# Patient Record
Sex: FEMALE | Race: BLACK OR AFRICAN AMERICAN | NOT HISPANIC OR LATINO | Employment: FULL TIME | URBAN - METROPOLITAN AREA
[De-identification: names, ages, dates, MRNs, and addresses within clinical notes are randomized per-mention and may not be internally consistent; named-entity substitution may affect disease eponyms.]

---

## 2017-01-06 ENCOUNTER — OFFICE VISIT (OUTPATIENT)
Dept: PSYCHIATRY | Facility: CLINIC | Age: 32
End: 2017-01-06
Payer: COMMERCIAL

## 2017-01-06 VITALS
HEART RATE: 99 BPM | HEIGHT: 60 IN | WEIGHT: 152 LBS | SYSTOLIC BLOOD PRESSURE: 119 MMHG | DIASTOLIC BLOOD PRESSURE: 69 MMHG | BODY MASS INDEX: 29.84 KG/M2

## 2017-01-06 DIAGNOSIS — F33.0 MDD (MAJOR DEPRESSIVE DISORDER), RECURRENT EPISODE, MILD: ICD-10-CM

## 2017-01-06 PROCEDURE — 99205 OFFICE O/P NEW HI 60 MIN: CPT | Mod: S$GLB,,, | Performed by: PSYCHIATRY & NEUROLOGY

## 2017-01-06 PROCEDURE — 99999 PR PBB SHADOW E&M-EST. PATIENT-LVL III: CPT | Mod: PBBFAC,,, | Performed by: PSYCHIATRY & NEUROLOGY

## 2017-01-06 PROCEDURE — 1159F MED LIST DOCD IN RCRD: CPT | Mod: S$GLB,,, | Performed by: PSYCHIATRY & NEUROLOGY

## 2017-01-06 RX ORDER — TRAZODONE HYDROCHLORIDE 50 MG/1
50 TABLET ORAL NIGHTLY PRN
Qty: 30 TABLET | Refills: 2 | Status: SHIPPED | OUTPATIENT
Start: 2017-01-06 | End: 2019-06-20

## 2017-01-06 RX ORDER — DULOXETIN HYDROCHLORIDE 30 MG/1
30 CAPSULE, DELAYED RELEASE ORAL DAILY
Qty: 30 CAPSULE | Refills: 2 | Status: SHIPPED | OUTPATIENT
Start: 2017-01-06 | End: 2018-04-03

## 2017-01-06 NOTE — PROGRESS NOTES
"Outpatient Psychiatry Initial Visit (MD/NP)    1/6/2017    Gauri Calle, a 31 y.o. female, presenting for initial evaluation visit. Met with patient.    Reason for Encounter: self-referral. Patient complains of depression.    History of Present Illness:  The pt notes that she is presenting for a review of her current medication regimen to see if there's a way to augment her current regimen. She notes that she is currently taking Elavil 60 mg PO QHS and Wellbutrin  mg PO Daily. She notes that she does find some efficacy in this regimen she is experiencing daytime fatigue that she attributes to her PM dose of Amitriptyline. She also notes that she is experiencing anxiety that is not well controlled by her current regimen. We discuss stopping her PM amitriptyline by titrating off the drug over the next week while starting treatment with Cymbalta 30 mg PO daily... We also discuss starting Trazodone 50 mg PO QHS PRN insomnia. We discuss the risks, benefits, side effects, inherent unpredictability and alternatives to this medication regimen. She opts to make these changes and is noted to have capacity to make medical decisions. We plan to f/u in approximately 1 month.     Past Psychiatric History:   Previous Psychiatric Hospitalizations: No   Previous Medication Trials: Amitriptyline, Wellbutrin  Previous Suicide Attempts: No   History of Violence: No     Nerurological History:   Seizures: No   Head trauma: No     Social History:   Developmental: "good" childhood   Education: College graduate   Employment Status/Info: Employed, as an  at a medical office   Financial: Stable   Marital Status:    Children: 0   Housing Status: Lives with  in [facility]   history: No  History of phys/sexual abuse: No   Access to gun: No   Gnosticism: No     Substance Abuse History:   Recreational Drugs: Denies   Use of Alcohol: Occasionally    Tobacco Use: Denies   Rehab History: No     Legal History: " "  Past Charges/Incarcerations: No   Pending charges: No     Family Psychiatric History:   Father and mother suffer from depression  Paternal aunt suffers from schizophrenia         Review Of Systems:     GENERAL:  No weight gain or loss  SKIN:  No rashes or lacerations  HEAD:  No headaches  EYES:  No exophthalmos, jaundice or blindness  EARS:  No dizziness, tinnitus or hearing loss  NOSE:  No changes in smell  MOUTH & THROAT:  No dyskinetic movements or obvious goiter  CHEST:  No shortness of breath, hyperventilation or cough  CARDIOVASCULAR:  No tachycardia or chest pain  ABDOMEN:  No nausea, vomiting, pain, constipation or diarrhea  URINARY:  No frequency, dysuria or sexual dysfunction  ENDOCRINE:  No polydipsia, polyuria  MUSCULOSKELETAL:  No pain or stiffness of the joints  NEUROLOGIC:  No weakness, sensory changes, seizures, confusion, memory loss, tremor or other abnormal movements  Pertinent items are noted in HPI.    Psychiatric Review Of Systems - Is patient experiencing or having changes in:  sleep: yes, insomnia treated with Elavil  appetite: no  weight: no  energy/anergy: yes, notes decrease energy  interest/pleasure/anhedonia: no  somatic symptoms: no  libido: yes, decreased, pt attributes to medication  anxiety/panic: no  guilty/hopelessness: yes, "sort of" "I feel like I should be doing more than I am career-wise"  concentration: Yes, easily distracted recently  S.I.B.s/risky behavior: no  Irritability: yes, notes low level of patience   Racing thoughts: no  Impulsive behaviors: no  Paranoia:no  AVH:no    Current Evaluation:     Nutritional Screening: Considering the patient's height and weight, medications, medical history and preferences, should a referral be made to the dietitian? no    Constitutional  Vitals:  Most recent vital signs, dated greater than 90 days prior to this appointment, were reviewed.    There were no vitals filed for this visit.     General:  unremarkable, age appropriate " "    Musculoskeletal  Muscle Strength/Tone:  no spasicity   Gait & Station:  non-ataxic     Psychiatric  Speech:  no latency; no press   Mood & Affect:  euthymic  congruent and appropriate   Thought Process:  normal and logical   Associations:  intact   Thought Content:  normal, no suicidality, no homicidality, delusions, or paranoia   Insight:  intact   Judgement: behavior is adequate to circumstances   Orientation:  grossly intact   Memory: intact for content of interview   Language: grossly intact   Attention Span & Concentration:  able to focus   Fund of Knowledge:  intact and appropriate to age and level of education       Relevant Elements of Neurological Exam: normal gait    Functioning in Relationships:  Spouse/partner: Notes a good relationship, but cites that she occasionally will "snap" at him  Peers: The pt notes numerous strong friendships in the area  Employers: 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  Latest known visit with results is:    Office Visit on 05/17/2016   Component Date Value Ref Range Status    Fungus (Mycology) Culture 05/17/2016 No fungus isolated after 4 weeks   Final         Medications  Outpatient Encounter Prescriptions as of 1/6/2017   Medication Sig Dispense Refill    amitriptyline (ELAVIL) 10 MG tablet 1 tablet per night the1st week then increase by one tablet every week until the pain is no longer felt or side effects occur. 90 tablet 3    amitriptyline (ELAVIL) 50 MG tablet Take 1 tablet (50 mg total) by mouth every evening. 90 tablet 3    buPROPion (WELLBUTRIN XL) 300 MG 24 hr tablet Take 300 mg by mouth once daily.      levocetirizine (XYZAL) 5 MG tablet TK 1 T PO  QD  1    LO LOESTRIN FE 1 mg-10 mcg (24)/10 mcg (2) Tab Take 1 tablet by mouth once daily. 28 tablet 11    montelukast (SINGULAIR) 10 mg tablet        No " facility-administered encounter medications on file as of 1/6/2017.            Assessment - Diagnosis - Goals:     Impression:   MDD, recurrent  R/o TYRONE    Strengths and Liabilities: Strength: Patient accepts guidance/feedback, Strength: Patient is intelligent., Strength: Patient is motivated for change., Strength: Patient is physically healthy., Strength: Patient has reasonable judgment., Strength: Patient is stable.    Treatment Goals:  Specify outcomes written in observable, behavioral terms:   Depression: acquiring relapse prevention skills, increasing energy, increasing interest in usual activities, increasing motivation, reducing excessive guilt, reducing fatigue and reducing negative automatic thoughts    Treatment Plan/Recommendations:   · Medication Management: The risks and benefits of medication were discussed with the patient. Tritate off Amitriptyline over the next week while starting Cymbalta 30 mg PO daily for mood and anxiety.  · Referral for further treatment to psychologist for psychotherapy  · The treatment plan and follow up plan were reviewed with the patient.      Return to Clinic: 1 month       Counseling time: 55 mins  Total time: 65 mins  Consulting clinician was informed of the encounter and consult note.    Lion Garrison MD  Psychiatry Resident

## 2017-02-24 ENCOUNTER — TELEPHONE (OUTPATIENT)
Dept: OBSTETRICS AND GYNECOLOGY | Facility: CLINIC | Age: 32
End: 2017-02-24

## 2017-02-24 NOTE — TELEPHONE ENCOUNTER
----- Message from Jewel Garcia sent at 2/24/2017 12:59 PM CST -----  Contact: pt  x_ 1st Request   _ 2nd Request   _ 3rd Request     Who: JULIÁN JUNE [[id number]]    Why: pt is requesting a call back in reference to getting some lab orders.    What Number to Call Back: [phone]    When to Expect a call back: (Before the end of the day)   -- if call after 3:00 call back will be tomorrow.

## 2017-02-24 NOTE — TELEPHONE ENCOUNTER
Returning a call. Spoke to patient in regards to her request for general health screen orders to have blood work due to c/o fatigue. Patient wants a call back from Maya to discuss further issues. Patient was informed Maya is out until Wednesday and the message will be sent to her. Patient verbalized all understanding. Message sent to provider/staff.

## 2017-03-01 NOTE — TELEPHONE ENCOUNTER
"Answered questions about general health screening, discussed should be done with PCP. Pt states did go to see someone on Monday and had "UTI and Sinus infection". Answered questions about next exam due with LINDSAY Singh due after June 16th, appt scheduled with pt  "

## 2017-05-10 DIAGNOSIS — Z30.41 SURVEILLANCE OF CONTRACEPTIVE PILL: ICD-10-CM

## 2017-05-10 DIAGNOSIS — Z01.419 WELL WOMAN EXAM WITH ROUTINE GYNECOLOGICAL EXAM: ICD-10-CM

## 2017-05-10 DIAGNOSIS — N94.810 VULVAR VESTIBULITIS: ICD-10-CM

## 2017-05-10 RX ORDER — NORETHINDRONE ACETATE AND ETHINYL ESTRADIOL, ETHINYL ESTRADIOL AND FERROUS FUMARATE 1MG-10(24)
KIT ORAL
Qty: 28 TABLET | Refills: 0 | Status: SHIPPED | OUTPATIENT
Start: 2017-05-10 | End: 2017-06-05 | Stop reason: SDUPTHER

## 2017-06-05 DIAGNOSIS — Z30.41 SURVEILLANCE OF CONTRACEPTIVE PILL: ICD-10-CM

## 2017-06-05 DIAGNOSIS — N94.810 VULVAR VESTIBULITIS: ICD-10-CM

## 2017-06-05 DIAGNOSIS — Z01.419 WELL WOMAN EXAM WITH ROUTINE GYNECOLOGICAL EXAM: ICD-10-CM

## 2017-06-09 RX ORDER — NORETHINDRONE ACETATE AND ETHINYL ESTRADIOL, ETHINYL ESTRADIOL AND FERROUS FUMARATE 1MG-10(24)
KIT ORAL
Qty: 28 TABLET | Refills: 0 | Status: SHIPPED | OUTPATIENT
Start: 2017-06-09 | End: 2019-06-20

## 2018-04-03 ENCOUNTER — OFFICE VISIT (OUTPATIENT)
Dept: OBSTETRICS AND GYNECOLOGY | Facility: CLINIC | Age: 33
End: 2018-04-03
Attending: OBSTETRICS & GYNECOLOGY
Payer: COMMERCIAL

## 2018-04-03 VITALS
SYSTOLIC BLOOD PRESSURE: 110 MMHG | BODY MASS INDEX: 26.84 KG/M2 | HEIGHT: 60 IN | DIASTOLIC BLOOD PRESSURE: 80 MMHG | WEIGHT: 136.69 LBS

## 2018-04-03 DIAGNOSIS — N94.810 VULVAR VESTIBULITIS: Primary | ICD-10-CM

## 2018-04-03 DIAGNOSIS — N94.10 FEMALE DYSPAREUNIA: ICD-10-CM

## 2018-04-03 DIAGNOSIS — B37.31 CANDIDIASIS OF VULVA AND VAGINA: ICD-10-CM

## 2018-04-03 DIAGNOSIS — F33.0 MDD (MAJOR DEPRESSIVE DISORDER), RECURRENT EPISODE, MILD: ICD-10-CM

## 2018-04-03 PROCEDURE — 87210 SMEAR WET MOUNT SALINE/INK: CPT | Mod: QW,S$GLB,, | Performed by: OBSTETRICS & GYNECOLOGY

## 2018-04-03 PROCEDURE — 99214 OFFICE O/P EST MOD 30 MIN: CPT | Mod: S$GLB,,, | Performed by: OBSTETRICS & GYNECOLOGY

## 2018-04-03 PROCEDURE — 99999 PR PBB SHADOW E&M-EST. PATIENT-LVL III: CPT | Mod: PBBFAC,,, | Performed by: OBSTETRICS & GYNECOLOGY

## 2018-04-03 PROCEDURE — 87102 FUNGUS ISOLATION CULTURE: CPT

## 2018-04-03 RX ORDER — LIDOCAINE 50 MG/G
OINTMENT TOPICAL
Qty: 50 G | Refills: 2 | Status: SHIPPED | OUTPATIENT
Start: 2018-04-03 | End: 2018-08-07

## 2018-04-03 RX ORDER — AMITRIPTYLINE HYDROCHLORIDE 10 MG/1
TABLET, FILM COATED ORAL
Qty: 90 TABLET | Refills: 2 | Status: SHIPPED | OUTPATIENT
Start: 2018-04-03 | End: 2019-06-20 | Stop reason: DRUGHIGH

## 2018-04-03 NOTE — PROGRESS NOTES
Subjective:     Patient ID: Gauri Alas is a 33 y.o. female.     Chief Complaint: Vaginal Dryness and Painful [place] (burning sensation afterwards)     History of Present Illness: This patient is a 33 y.o. female, who presents to the GYN Vulva clinic for reevaluation of vulvodynia and dyspareunia.  The patient discontinue the use of all her vulvodynia medications and has redeveloped discomfort with intercourse.  She request a second referral to the pelvic floor physical therapist.        Patient's last menstrual period was 03/21/2018 (approximate).    Review of Systems    GENERAL: No fever, chills, fatigability or weightchange  SKIN: No rashes, itching or changes in color or texture of skin.  HEAD: No headaches or recent head trauma.  EYES: Visual acuity fine. No photophobia,r diplopia.  EARS: Denies earache or vertigo  NOSE: No loss of smell, no epistaxis or postnasal drip.  MOUTH & THROAT: No hoarseness or change in voice.   NODES: Denies swollen glands.  CHEST: Denies MARIE, cyanosis, wheezing, cough and sputum production.  CARDIOVASCULAR: Denies chest pain, PND, orthopnea or reduced exercise tolerance.  ABDOMEN: Appetite fine. No weight loss. bloating, Denies diarrhea, abdominal pain, hematemesis or blood in stool.  URINARY: No flank pain, dysuria or hematuria.  PERIPHERAL VASCULAR: No claudication or cyanosis.Varicosities  MUSCULOSKELETAL: No joint stiffness or swelling. Denies back pain.muscle aches  NEUROLOGIC: No history of seizures, paralysis, alteration of gait or coordination.       Objective:       Physical Exam     APPEARANCE: Well nourished, well developed, in no acute distress.    GENITOURINARY:  Vulva: No lesions. abnormal female genital architecture with reduction in the size of the labia minora. There was no evidence of fusion of the prepuce and frenulum.  There were no white plaques noted. Q-Tip test indicates a 1-2 out of 5 discomfort in the vestibule from 5 to 7:00  Urethral Meatus:  Normal size and location, no lesions, no prolapse.  Urethra: No masses, tenderness, prolapse or scarring.  Vagina: moist withrugae, no discharge, no significant cystocele or rectocele.  Cervix: No lesions, normal diameter, no stenosis, no cervical motion tenderness. .  Uterus: 6 week size, regular shape, mobile, non-tender, normal position, good support.  Adnexa: No masses, tenderness or CDS nodularity.  Anus Perineum: No lesions, no relaxation, no external hemorrhoids.  Abdomen: No masses, tenderness, hernia or ascites, no hepatasplenomegaly  Skin: No rashes, lesions, ulcers, acne, hirsutism.  Peripheral/lower extremities: No edema, erythema or tenderness.  Lymphatic: No axillary, neck or groin nodes palp.  Mental Status: Alert, oriented x 3, normal affect and mood.              @PROCEDURE:@  Wet Prep:  pH = 4.4  -WBCS = rare  -Lactobacilli = numerous  -BV = Amsel  Negative  -Candida = Hyphae none seen  -Trichomnas = none seen  -Cells- Basal and Parabasal, Superficial: Maturation: Superficial cells predominate  -Impression:Normal wet prep  -Treatment: none indicated  -[facility] in 12 weeks         Assessment:      1. Vulvodynia/Vestibuodynia    2. Female dyspareunia    3. MDD (major depressive disorder), recurrent episode, mild    4. Candidiasis of vulva and vagina               Plan:  1.  Discussed the use of amitriptyline progressive therapy, GBA cream  And lidocaine ointment.  2.  Refer to pelvic floor physical therapist as per patient request.  3.  Candida culture of the vagina taken today.  4.  Return to clinic in 12 weeks.                        Orders Placed This Encounter   Procedures    Fungus culture

## 2018-04-27 ENCOUNTER — CLINICAL SUPPORT (OUTPATIENT)
Dept: REHABILITATION | Facility: HOSPITAL | Age: 33
End: 2018-04-27
Attending: OBSTETRICS & GYNECOLOGY
Payer: COMMERCIAL

## 2018-04-27 DIAGNOSIS — M62.9 DISORDER OF MUSCLE: Primary | ICD-10-CM

## 2018-04-27 PROCEDURE — 97110 THERAPEUTIC EXERCISES: CPT | Mod: PO

## 2018-04-27 PROCEDURE — 97161 PT EVAL LOW COMPLEX 20 MIN: CPT | Mod: PO

## 2018-04-27 NOTE — PLAN OF CARE
Time in: 8:23 (late arrival)  Time out: 9:05      Outpatient Physical Therapy   Initial Evaluation    Gauri is a 31 y.o. female evaluated on 4/27/2018    Physician:  Bert Javed III, *   Diagnosis:   Encounter Diagnosis   Name Primary?    Vulvar vestibulitis Yes        Treatment ordered: PT    Medical History:   Past Medical History   Diagnosis Date    Abnormal Pap smear      HPV Neg, repaps    Abnormal Pap smear of cervix 04/2012     ASCUS, NEG HPV    Mental disorder      depression per pt        Surgical History: No past surgical history on file.     Medications:   Current Outpatient Prescriptions   Medication Sig    amitriptyline (ELAVIL) 10 MG tablet 1 tablet per night the1st week then increase by one tablet every week until the pain is no longer felt or side effects occur.    amitriptyline (ELAVIL) 50 MG tablet Take 1 tablet (50 mg total) by mouth every evening.    buPROPion (WELLBUTRIN XL) 300 MG 24 hr tablet Take 300 mg by mouth once daily.    levocetirizine (XYZAL) 5 MG tablet TK 1 T PO  QD    LO LOESTRIN FE 1 mg-10 mcg (24)/10 mcg (2) Tab Take 1 tablet by mouth once daily.    montelukast (SINGULAIR) 10 mg tablet      No current facility-administered medications for this visit.        Allergies: No Known Allergies   Precautions: universal   OB/GYN History: painful vaginal penetration and pelvic pain  Bladder/Bowel History: none       Barriers to Learning: none  Educational/Spiritual/Cultural needs: none  Environmental Barriers: none noted  Abuse/Neglect: no signs  Nutritional Status: well developed well nourished  Fall Risk: none    Subjective     Pt reports that she got  2 weeks after her last appointment 2 years ago.  She does not initiate and enjoy sex at all due to pain.  This is now an issue she would like to address.  She had stopped taking her medications- is now back on amitriptyline and GBA cream.  She doesn't have the GBA yet.  Feels a slight difference with 10mg ami.  She  uses coconut oil for lubricant, and notes tenderness both at the entrance and further in to the vaginal canal.  Most recent UTI was 3-4 months ago.  She reports being able to use tampons- no problems with tight pants or sitting.      Pain: Patient reports 0/10 with 0 being the lowest and 10 being the highest.   Worst pain with last intercourse: 6/10  Best: 0/10    Pain or lack of sensation with vaginal/pelvic exam? Yes  Sexually active? Yes    Previous PT treatment included: one session of pelvic floor PT    Frequency of Urination: Daytime: 5-6        Nighttime: 1-2 (wakes to urge)    Urine Stream: strong    Bladder Leakage: No    Do you feel like you are emptying completely? 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      Patient's Goals: To reduce the amount of medication I'm on if possible, address the issues that are causing the pelvic pain as well as be able to enjoy and have pleasure during intercourse/sexual activity with my partner.    Objective     ORTHO SCREEN  Posture: WNL     Pelvic Alignment: no deviations noted in supine      ABDOMINALS  Scarring: none      Diastasis: none   Abdominal strength: Rectus: 3+/5     Transverse: palpable contraction       VAGINAL PELVIC FLOOR EXAM  EXTERNAL ASSESSMENT  Skin Condition: WNL, symmetry  Scarring: none  Sensation: WNL  Pain: Q-tip testing as follows: painful at 6-9 o'clock with redness/irritation noted  Introitus: WNL   Voluntary Contraction:  minimally visible lift (difficult to visualize due to increased tissue resistance)  Voluntary Relaxation: nil  Involuntary Contraction: minimally visible lift  Involuntary Relaxation: nil  Perineal Descent: absent   Bulge: present    INTERNAL ASSESSMENT  Pain: tender areas noted as follows: levator ani tight on right; pain at 6 o'clock position at entry point  Sensation: able to localize pressure appropriately    Vaginal Vault: tight, bulky  Muscle Bulk: hypertonus  Muscle Power: 4/5 R, 3/5 L     Quality of Contraction: slow relaxation and incomplete relaxation  Specificity: pt demonstrates increased mm tone throughout overflow mm with difficulty relaxing   Coordination: tends to hold breath during PFM contraction  Prolapse Check: none    SEMG EVALUATION: Deferred due to time constraints    TREATMENT  Pt received 10 minutes of individual therapy including: diaphragmatic breathing in supine, and yoga postures for drop.  She was also given the [facility] website to order a dilator set.     Education: Instructed on general anatomy/physiology of urinary/bowel system; discussed plan of care with patient; instructed in purpose of physical therapy and the  benefits/risks of treatment; instructed in risks of refusing treatment. Patient agreed to treatment plan. Gauri demonstrated and verbalized understanding of all instruction and was provided with a handout of HEP (see Patient Instructions).    Assessment     This is a 31 y.o. female referred to outpatient physical therapy and presents with a medical diagnosis of vulvar vestibulitis and dyspareunia since 2008 and demonstrates limitations as described in the problem list. . Pt will benefit from physcial therapy services in order to maximize pain free functional independence and improve her QOL. The following goals were discussed with the patient and she is in agreement with them as to be addressed in the treatment plan.  Pt verbally understood the instructions as they were  given and demonstrated proper form and technique during therapy.   Prognosis: good    Problem List  Pelvic floor tenderness  Pain with intercourse  Decreased pelvic muscle strength  Decreased endurance of the pelvic muscles  Decreased phasic ability of the pelvic muscles  Increased tension of the pelvic muscles  Poor quality of pelvic muscle contraction  Myofascial restriction and decreased mobility of overflow muscles including adductors and gluts    GOALS    Long Term Goals: 3 months   1. Pt will be able to maintain normal breathing pattern during pelvic floor muscle contraction in order to decrease pain and improve PF function.  2. Pt will report ability to tolerate intercourse with no more than 2/10 pain on initial penetration or deeply.  3. Pt will report 0-1/10 pelvic pain during walking in order to maximize functional independence and improve QOL.  4. Pt will be I with self management of symptoms  5. Pt will be I with HEP.        Plan     Pt will be treated by physical therapy once per 1-2 weeks for 3 months for Pt education, HEP, therapeutic exercises, neuromuscular re-education, therapeutic activity, manual therapy, and modalities (including SEMG) PRN to achieve established goals.       I have reviewed the therapist's note,and agree with the diagnosis and management plan

## 2018-04-27 NOTE — PATIENT INSTRUCTIONS
Home Exercise Program: 04/27/2018    DIAPHRAGMATIC BREATHING     The diaphragm is a dome shaped muscle that forms the floor of the rib cage. It is the most efficient muscle for breathing and relaxation, although most people are not used to using the diaphragm. Diaphragmatic or belly breathing is an important technique to learn because it helps settle down or relax the autonomic nervous system. The correct use of diaphragmatic breathing can help to quiet brain activity resulting in the relaxation of all the muscles and organs of the body. This is accomplished by slow rhythmic breathing concentrated in the diaphragm muscle rather than the chest.    How to do proper relaxation breathing:     Start by lying on your back or reclining in a chair in a relaxed position. Place one hand on your chest and the other on your abdomen.   Relax your jaw by placing your tongue on the roof of your mouth and keeping your teeth slightly apart.    Take a deep breath in, letting the abdomen expand and rise while you keep your upper chest, neck and shoulders relaxed.    As you breathe out, allow your abdomen and chest to fall. Exhale completely.   It doesn't matter if you breathe in/out through your nose and/or mouth. Do whichever feels comfortable.   Remember to breathe slowly.  Do not force your breathing. Do not hold your breath.   Repeat for 5-10 minutes every day.      Home Program: 04/27/2018    YOGA POSES   to improve pelvic floor muscle DROP.  Maintain each position for 1 minute, 1-2 times per day.       Deep Squat    Happy Baby            Child's Pose

## 2018-05-10 ENCOUNTER — CLINICAL SUPPORT (OUTPATIENT)
Dept: REHABILITATION | Facility: HOSPITAL | Age: 33
End: 2018-05-10
Attending: OBSTETRICS & GYNECOLOGY
Payer: COMMERCIAL

## 2018-05-10 ENCOUNTER — OFFICE VISIT (OUTPATIENT)
Dept: OBSTETRICS AND GYNECOLOGY | Facility: CLINIC | Age: 33
End: 2018-05-10
Attending: OBSTETRICS & GYNECOLOGY
Payer: COMMERCIAL

## 2018-05-10 VITALS
HEIGHT: 60 IN | SYSTOLIC BLOOD PRESSURE: 114 MMHG | DIASTOLIC BLOOD PRESSURE: 70 MMHG | BODY MASS INDEX: 27.44 KG/M2 | WEIGHT: 139.75 LBS

## 2018-05-10 DIAGNOSIS — Z01.419 WELL WOMAN EXAM WITH ROUTINE GYNECOLOGICAL EXAM: Primary | ICD-10-CM

## 2018-05-10 DIAGNOSIS — N94.810 VULVAR VESTIBULITIS: ICD-10-CM

## 2018-05-10 DIAGNOSIS — N94.10 FEMALE DYSPAREUNIA: ICD-10-CM

## 2018-05-10 DIAGNOSIS — M62.9 DISORDER OF MUSCLE: Primary | ICD-10-CM

## 2018-05-10 LAB — FUNGUS SPEC CULT: NORMAL

## 2018-05-10 PROCEDURE — 97110 THERAPEUTIC EXERCISES: CPT | Mod: PO

## 2018-05-10 PROCEDURE — 99395 PREV VISIT EST AGE 18-39: CPT | Mod: S$GLB,,, | Performed by: OBSTETRICS & GYNECOLOGY

## 2018-05-10 PROCEDURE — 99999 PR PBB SHADOW E&M-EST. PATIENT-LVL II: CPT | Mod: PBBFAC,,, | Performed by: OBSTETRICS & GYNECOLOGY

## 2018-05-10 NOTE — PATIENT INSTRUCTIONS
Home Exercise Program: 05/10/2018    DILATOR TRAINING  1. Make sure the dilator is clean, free of any visible soiling.   2. Apply water-based lubricant (ie. Slippery stuff, coconut oil, olive oil) to the dilator and the vaginal opening.   3. Either sit on the toilet and lean back on the tank OR lay back with your back well supported by several pillows and both knees bent.   4. Spread labia and insert the dilator.  Do pressure desensitization at the entrance (vestibule) if you need to.      With the smallest size:  5. Apply downward pressure onto the levator ani group of muscles on one side.     To find the levator ani muscles:   - In the middle at 6 o'clock = Rectum   - Move a little to the right at 4 o'clock = Right levator ani group   - Move a little to the left at 8 o'clock = Left levator ani group   - If you feel sharp, stabbing pain = bone or pudendal nerve (wrong)  6. Begin Diaphragmatic Breathing and drop your pelvic floor muscle (releasing the tension), following the encouragement of the dilator.  7. Repeat on the levator ani group of muscles on the other side.  8. Continue for 5-10 minutes.    With the next size:  9. Insert the dilator until it begins to get moderately uncomfortable. Stop at this point.  10. Begin Diaphragmatic Breathing and focus on DROPPING the pelvic floor muscles, releasing their tension.   11. Once the discomfort has dissipated, insert the dilator a little bit more, and repeat this process.  12. You can also move the dilator slightly side/side or forward/backward to get the tissues use to being mobilized.   13. Continue for 5 minutes.     14. Once finished, remove dilator.  15. Wash with anti-bacterial soap and thoroughly rinse. Let air dry whenever possible. Store in a dry, clean location.   16. Repeat 5 of the 7 days per week.(every other day)    *Don't apply so much pressure that it leaves you too sore to perform again the next day.    STOP if there is increased bleeding, an  infection, or extreme pain. (or pregnancy)

## 2018-05-10 NOTE — PROGRESS NOTES
OUTPATIENT PHYSICAL THERAPY DAILY NOTE       Patient: Gauri Alas   [facility] #:  [id number]    Diagnosis:   Encounter Diagnosis   Name Primary?    Disorder of muscle Yes      Date of treatment: 05/10/2018     Time in: 1:07  Time out: 2:00  Billable time: 45 minutes  Visit #: 2      SUBJECTIVE   Pt reports: pt reports that she ordered her dilator set but it hasn't come in yet.  Has been working on her yoga poses, mainly child's pose and happy baby.  Pain: 0/10 on VAS scale  Pain location: NA    OBJECTIVE     Therapeutic Exercise to develop  flexibility for 45 minutes including: passive stretching of levator ani and connective tissues of the vaginal canal intravaginally.   She was instructed in technique and in procedure for care and cleaning of her dilator set once it comes in.  She tolerated both ischemic pressure and contract/relax fairly well; also tolerated 2 exam fingers well.      Education: Discussed progression of plan of care with patient; educated pt in activity modification; reviewed HEP with pt. Pt demonstrated and verbalized understanding of all instruction and was provided with a handout of HEP (see Patient Instructions).      ASSESSMENT      Pt tolerated entire treatment well with no visible adverse effects. Should be able to start with her dilator at home- she will do well if she is consistent in her home dilator stretching program.    Will the patient continue to benefit from skilled PT intervention? Yes  Medical necessity demonstrated by: skilled PT supervision required for HEP and treatment progression  Progress towards goals:  satisfactory  New/Revised goals: none      PLAN     Continue with established Plan of Care, working toward established PT goals.

## 2018-05-10 NOTE — PROGRESS NOTES
Subjective:     Patient ID: Gauri Alas is a 33 y.o. female.     Chief Complaint: Well Woman     History of Present Illness: This patient is a 33 y.o.  female, who presents to the GYN clinic for her gyn well woman yearly exam.  She denies gyn complaints.  Her vulvodynia problem has improved.  She is on birth control pills for contraception.      No LMP recorded. Patient is not currently having periods (Reason: Birth Control).    Review of Systems    GENERAL: No fever, chills, fatigability or weightchange  SKIN: No rashes, itching or changes in color or texture of skin.  HEAD: No headaches or recent head trauma.  EYES: Visual acuity fine. No photophobia,r diplopia.  EARS: Denies earache or vertigo  NOSE: No loss of smell, no epistaxis or postnasal drip.  MOUTH & THROAT: No hoarseness or change in voice.   NODES: Denies swollen glands.  CHEST: Denies MARIE, cyanosis, wheezing, cough and sputum production.  CARDIOVASCULAR: Denies chest pain, PND, orthopnea or reduced exercise tolerance.  ABDOMEN: Appetite fine. No weight loss. bloating, Denies diarrhea, abdominal pain, hematemesis or blood in stool.  URINARY: No flank pain, dysuria or hematuria.  PERIPHERAL VASCULAR: No claudication or cyanosis.Varicosities  MUSCULOSKELETAL: No joint stiffness or swelling. Denies back pain.muscle aches  NEUROLOGIC: No history of seizures, paralysis, alteration of gait or coordination.       Objective:       Physical Exam     APPEARANCE: Well nourished, well developed, in no acute distress.    GENITOURINARY:  Vulva: No lesions. Normal female genital architecture.  Q-tip test indicated minimal discomfort in the vestibule.  Urethral Meatus: Normal size and location, no lesions, no prolapse.  Urethra: No masses, tenderness, prolapse or scarring.  Vagina: Moist with rugae, no discharge, no significant cystocele or rectocele.  Cervix: No lesions, normal diameter, no stenosis, no cervical motion tenderness. .  Uterus: 6 week size,  regular shape, mobile, non-tender, normal position, good support.  Adnexa: No masses, tenderness or CDS nodularity.  Anus Perineum: No lesions, no relaxation, no external hemorrhoids.  Breasts: Symmetrical, no skin changes or visible lesions. No palpable masses, nipple discharge or adenopathy bilaterally.  Abdomen: No masses, tenderness, hernia or ascites, no hepatasplenomegaly  Neck: Supple. Symmetric without masses. No thyromegaly.  Skin: No rashes, lesions, ulcers, acne, hirsutism.  Respiratory: Breath sounds clear bilateraly. Good air movement. No rales. No wheezes.  Cardiovascular: Normal rate. Regular rhythm.  Peripheral/lower extremities: No edema, erythema or tenderness.  Lymphatic: No axillary, neck or groin nodes palp.  Mental Status: Alert, oriented x 3, normal affect and mood.          @PROCEDURE:@           Assessment:      1. Well woman exam with routine gynecological exam    2. Female dyspareunia    3. Vulvodynia/Vestibuodynia               Plan:  1.  Doing well with vestibulodynia continue present regimen and physical therapy.  2.  Return to clinic for follow-up exam in 6 months.  3.  Return to clinic for well woman exam.                No orders of the defined types were placed in this encounter.

## 2018-08-07 ENCOUNTER — OFFICE VISIT (OUTPATIENT)
Dept: OBSTETRICS AND GYNECOLOGY | Facility: CLINIC | Age: 33
End: 2018-08-07
Attending: OBSTETRICS & GYNECOLOGY
Payer: COMMERCIAL

## 2018-08-07 VITALS
BODY MASS INDEX: 27.57 KG/M2 | HEIGHT: 60 IN | DIASTOLIC BLOOD PRESSURE: 80 MMHG | SYSTOLIC BLOOD PRESSURE: 118 MMHG | WEIGHT: 140.44 LBS

## 2018-08-07 DIAGNOSIS — R30.0 DYSURIA: ICD-10-CM

## 2018-08-07 DIAGNOSIS — N89.8 VAGINAL DISCHARGE: Primary | ICD-10-CM

## 2018-08-07 LAB
CANDIDA RRNA VAG QL PROBE: NEGATIVE
G VAGINALIS RRNA GENITAL QL PROBE: NEGATIVE
T VAGINALIS RRNA GENITAL QL PROBE: NEGATIVE

## 2018-08-07 PROCEDURE — 99999 PR PBB SHADOW E&M-EST. PATIENT-LVL III: CPT | Mod: PBBFAC,,, | Performed by: OBSTETRICS & GYNECOLOGY

## 2018-08-07 PROCEDURE — 99213 OFFICE O/P EST LOW 20 MIN: CPT | Mod: S$GLB,,, | Performed by: OBSTETRICS & GYNECOLOGY

## 2018-08-07 PROCEDURE — 87510 GARDNER VAG DNA DIR PROBE: CPT

## 2018-08-07 PROCEDURE — 87480 CANDIDA DNA DIR PROBE: CPT

## 2018-08-07 PROCEDURE — 87086 URINE CULTURE/COLONY COUNT: CPT

## 2018-08-07 PROCEDURE — 3008F BODY MASS INDEX DOCD: CPT | Mod: CPTII,S$GLB,, | Performed by: OBSTETRICS & GYNECOLOGY

## 2018-08-07 NOTE — PROGRESS NOTES
Gauri Alas is a 33 y.o. female patient  NEW TO ME (patient of Dr. Tello GALLOWAY) who presents today with a 2 day history of dysuria and vaginal discharge with odor. SHe took some leftover Amoxicillin/Clav which she had left over from a dental procedure and has had slight improvement. Periods are spaced out due to taking her pills continuously to avoid cycles.     Patient's last menstrual period was 2018 (approximate).    Past Medical History:   Diagnosis Date    Abnormal Pap smear     HPV Neg, repaps    Abnormal Pap smear of cervix 2012    ASCUS, NEG HPV    Mental disorder     depression per pt     History reviewed. No pertinent surgical history.  Social History     Social History    Marital status: Single     Spouse name: N/A    Number of children: N/A    Years of education: N/A     Occupational History    Not on file.     Social History Main Topics    Smoking status: Never Smoker    Smokeless tobacco: Never Used    Alcohol use 0.6 oz/week     1 Glasses of wine per week      Comment: socially    Drug use: No    Sexual activity: Yes     Partners: Male     Birth control/ protection: OCP      Comment: , together since      Other Topics Concern    Not on file     Social History Narrative    No narrative on file     Family History   Problem Relation Age of Onset    Diabetes Maternal Grandmother     Hypertension Father     Breast cancer Neg Hx     Ovarian cancer Neg Hx     Colon cancer Neg Hx      OB History      Para Term  AB Living    0 0 0 0 0 0    SAB TAB Ectopic Multiple Live Births    0 0 0 0                  ROS:  GENERAL: Feeling well overall.   SKIN: Denies rash or lesions.   HEAD: Denies head injury or headache.   NODES: Denies enlarged lymph nodes.   CHEST: Denies chest pain or shortness of breath.   CARDIOVASCULAR: Denies palpitations or left sided chest pain.   ABDOMEN: No abdominal pain, nausea, vomiting or rectal bleeding.   URINARY: +  dysuria,+ burning on urination. Denies hematuria,   REPRODUCTIVE: See HPI.   BREASTS: Denies pain, lumps, or nipple discharge.   HEMATOLOGIC: No easy bruisability or excessive bleeding.   MUSCULOSKELETAL: Denies joint pain or swelling.   NEUROLOGIC: Denies syncope or weakness.   PSYCHIATRIC: Reports depression.    /80   Ht 5' (1.524 m)   Wt 63.7 kg (140 lb 6.9 oz)   LMP 05/01/2018 (Approximate)   BMI 27.43 kg/m²     PE:   APPEARANCE: Well nourished, well developed, in no acute distress.  ABDOMEN: Soft. No tenderness or masses. No hernias. No CVA tenderness.  VULVA: No lesions. Normal female genitalia.  URETHRAL MEATUS: Normal size and location, no lesions, no prolapse.  URETHRA: No masses, tenderness, prolapse or scarring.  VAGINA: Moist and well rugated, no discharge, no significant cystocele or rectocele.  CERVIX: No lesions and discharge.   UTERUS: Normal size, regular shape, mobile, non-tender, bladder base nontender.  ADNEXA: No masses, tenderness or CDS nodularity.  ANUS PERINEUM: Normal.    PROCEDURES:    1. Vaginal discharge  Vaginosis Screen by DNA Probe   2. Dysuria  Urine culture    AND PLAN:      Follow-up if symptoms worsen or fail to improve.

## 2018-08-08 ENCOUNTER — PATIENT MESSAGE (OUTPATIENT)
Dept: OBSTETRICS AND GYNECOLOGY | Facility: CLINIC | Age: 33
End: 2018-08-08

## 2018-08-08 LAB — BACTERIA UR CULT: NO GROWTH

## 2018-09-05 ENCOUNTER — DOCUMENTATION ONLY (OUTPATIENT)
Dept: REHABILITATION | Facility: HOSPITAL | Age: 33
End: 2018-09-05

## 2019-06-20 ENCOUNTER — OFFICE VISIT (OUTPATIENT)
Dept: OBSTETRICS AND GYNECOLOGY | Facility: CLINIC | Age: 34
End: 2019-06-20
Attending: OBSTETRICS & GYNECOLOGY
Payer: COMMERCIAL

## 2019-06-20 VITALS
WEIGHT: 136.44 LBS | DIASTOLIC BLOOD PRESSURE: 70 MMHG | SYSTOLIC BLOOD PRESSURE: 114 MMHG | HEIGHT: 60 IN | BODY MASS INDEX: 26.79 KG/M2

## 2019-06-20 DIAGNOSIS — Z01.419 WELL WOMAN EXAM WITH ROUTINE GYNECOLOGICAL EXAM: Primary | ICD-10-CM

## 2019-06-20 DIAGNOSIS — N94.10 FEMALE DYSPAREUNIA: ICD-10-CM

## 2019-06-20 DIAGNOSIS — Z30.8 ENCOUNTER FOR OTHER CONTRACEPTIVE MANAGEMENT: ICD-10-CM

## 2019-06-20 DIAGNOSIS — N94.810 VULVAR VESTIBULITIS: ICD-10-CM

## 2019-06-20 PROCEDURE — 99999 PR PBB SHADOW E&M-EST. PATIENT-LVL III: CPT | Mod: PBBFAC,,, | Performed by: OBSTETRICS & GYNECOLOGY

## 2019-06-20 PROCEDURE — 99395 PREV VISIT EST AGE 18-39: CPT | Mod: S$GLB,,, | Performed by: OBSTETRICS & GYNECOLOGY

## 2019-06-20 PROCEDURE — 99395 PR PREVENTIVE VISIT,EST,18-39: ICD-10-PCS | Mod: S$GLB,,, | Performed by: OBSTETRICS & GYNECOLOGY

## 2019-06-20 PROCEDURE — 88175 CYTOPATH C/V AUTO FLUID REDO: CPT

## 2019-06-20 PROCEDURE — 99999 PR PBB SHADOW E&M-EST. PATIENT-LVL III: ICD-10-PCS | Mod: PBBFAC,,, | Performed by: OBSTETRICS & GYNECOLOGY

## 2019-06-20 RX ORDER — TRAZODONE HYDROCHLORIDE 100 MG/1
TABLET ORAL
COMMUNITY
Start: 2019-06-18

## 2019-06-20 RX ORDER — ELETRIPTAN HYDROBROMIDE 40 MG/1
TABLET, FILM COATED ORAL
Refills: 0 | COMMUNITY
Start: 2019-04-13

## 2019-06-20 RX ORDER — NORGESTIMATE AND ETHINYL ESTRADIOL 7DAYSX3 LO
1 KIT ORAL DAILY
Refills: 1 | COMMUNITY
Start: 2019-05-20

## 2019-06-20 RX ORDER — AMITRIPTYLINE HYDROCHLORIDE 50 MG/1
50 TABLET, FILM COATED ORAL NIGHTLY
Refills: 3 | COMMUNITY
Start: 2019-04-13 | End: 2020-11-12

## 2019-06-20 RX ORDER — AMITRIPTYLINE HYDROCHLORIDE 50 MG/1
50 TABLET, FILM COATED ORAL NIGHTLY
Qty: 90 TABLET | Refills: 3 | Status: SHIPPED | OUTPATIENT
Start: 2019-06-20 | End: 2020-06-19

## 2019-06-20 NOTE — PROGRESS NOTES
Subjective:     Patient ID: Gauri Alas is a 34 y.o. female.     Chief Complaint: Well Woman     History of Present Illness: This patient is a 33 y.o. female, who presents to the GYN clinic for her gyn well woman yearly exam.  She denies gyn complaints.  Her vulvodynia problem has improved.  She uses birth control pills for contraception.        Patient's last menstrual period was 06/13/2019.    Review of Systems    GENERAL: No fever, chills, fatigability or weightchange  SKIN: No rashes, itching or changes in color or texture of skin.  HEAD: No headaches or recent head trauma.  EYES: Visual acuity fine. No photophobia,r diplopia.  EARS: Denies earache or vertigo  NOSE: No loss of smell, no epistaxis or postnasal drip.  MOUTH & THROAT: No hoarseness or change in voice.   NODES: Denies swollen glands.  CHEST: Denies MARIE, cyanosis, wheezing, cough and sputum production.  CARDIOVASCULAR: Denies chest pain, PND, orthopnea or reduced exercise tolerance.  ABDOMEN: Appetite fine. No weight loss. bloating, Denies diarrhea, abdominal pain, hematemesis or blood in stool.  URINARY: No flank pain, dysuria or hematuria.  PERIPHERAL VASCULAR: No claudication or cyanosis.Varicosities  MUSCULOSKELETAL: No joint stiffness or swelling. Denies back pain.muscle aches  NEUROLOGIC: No history of seizures, paralysis, alteration of gait or coordination.       Objective:       Physical Exam     APPEARANCE: Well nourished, well developed, in no acute distress.    GENITOURINARY:  Vulva: No lesions. Normal female genital architecture.  Q-tip test indicated minimal discomfort in the vestibule.  Urethral Meatus: Normal size and location, no lesions, no prolapse.  Urethra: No masses, tenderness, prolapse or scarring.  Vagina: Moist with rugae, no discharge, no significant cystocele or rectocele.  Cervix: No lesions, normal diameter, no stenosis, no cervical motion tenderness. .  Uterus: 6 week size, regular shape, mobile, non-tender,  normal position, good support.  Adnexa: No masses, tenderness or CDS nodularity.  Anus Perineum: No lesions, no relaxation, no external hemorrhoids.  Breasts: Symmetrical, no skin changes or visible lesions. No palpable masses, nipple discharge or adenopathy bilaterally.  Abdomen: No masses, tenderness, hernia or ascites, no hepatasplenomegaly  Neck: Supple. Symmetric without masses. No thyromegaly.  Skin: No rashes, lesions, ulcers, acne, hirsutism.  Respiratory: Breath sounds clear bilateraly. Good air movement. No rales. No wheezes.  Cardiovascular: Normal rate. Regular rhythm.  Peripheral/lower extremities: No edema, erythema or tenderness.  Lymphatic: No axillary, neck or groin nodes palp.  Mental Status: Alert, oriented x 3, normal affect and mood.          @PROCEDURE:@           Assessment:      1. Well woman exam with routine gynecological exam    2. Encounter for other contraceptive management    3. Female dyspareunia    4. Vulvodynia/Vestibuodynia               Plan:  1.  The patient is on continuous birth control pill therapy(only takes the hormone pill) and has experienced spotting prior to ending her pill pack.  Discussed ending in the pill pack waiting a 5 days and restarting the next pack.  2.  Pap smear done today.  3.  Return to clinic for well-woman exam.                  No orders of the defined types were placed in this encounter.

## 2020-03-09 ENCOUNTER — TELEPHONE (OUTPATIENT)
Dept: OBSTETRICS AND GYNECOLOGY | Facility: CLINIC | Age: 35
End: 2020-03-09

## 2020-03-09 NOTE — TELEPHONE ENCOUNTER
States going to [facility] pelvic floor PT and they are asking for her records. Pt can fill out a release of records form and send to [facility] medical records department

## 2021-04-16 ENCOUNTER — PATIENT MESSAGE (OUTPATIENT)
Dept: RESEARCH | Facility: HOSPITAL | Age: 36
End: 2021-04-16

## 2022-10-01 ENCOUNTER — HOSPITAL ENCOUNTER (EMERGENCY)
Facility: HOSPITAL | Age: 37
Discharge: HOME | End: 2022-10-01
Attending: EMERGENCY MEDICINE
Payer: COMMERCIAL

## 2022-10-01 VITALS
BODY MASS INDEX: 27.88 KG/M2 | SYSTOLIC BLOOD PRESSURE: 136 MMHG | HEART RATE: 56 BPM | WEIGHT: 142 LBS | DIASTOLIC BLOOD PRESSURE: 76 MMHG | TEMPERATURE: 99 F | HEIGHT: 60 IN | OXYGEN SATURATION: 99 % | RESPIRATION RATE: 19 BRPM

## 2022-10-01 DIAGNOSIS — M79.662 PAIN OF LEFT CALF: Primary | ICD-10-CM

## 2022-10-01 PROBLEM — R30.0 DYSURIA: Status: ACTIVE | Noted: 2021-06-09

## 2022-10-01 PROBLEM — F32.A DEPRESSION: Status: ACTIVE | Noted: 2022-10-01

## 2022-10-01 PROBLEM — F41.9 ANXIETY: Status: ACTIVE | Noted: 2022-10-01

## 2022-10-01 PROBLEM — N39.0 URINARY TRACT INFECTION: Status: ACTIVE | Noted: 2022-10-01

## 2022-10-01 LAB
B-HCG UR QL: NEGATIVE
CTP QC/QA: YES

## 2022-10-01 PROCEDURE — 81025 URINE PREGNANCY TEST: CPT | Performed by: EMERGENCY MEDICINE

## 2022-10-01 PROCEDURE — 99284 EMERGENCY DEPT VISIT MOD MDM: CPT | Mod: 25

## 2022-10-01 PROCEDURE — 25000003 PHARM REV CODE 250: Performed by: PHYSICIAN ASSISTANT

## 2022-10-01 RX ORDER — MELOXICAM 7.5 MG/1
7.5 TABLET ORAL DAILY
Qty: 12 TABLET | Refills: 0 | Status: SHIPPED | OUTPATIENT
Start: 2022-10-01

## 2022-10-01 RX ORDER — ACETAMINOPHEN 500 MG
1000 TABLET ORAL
Status: COMPLETED | OUTPATIENT
Start: 2022-10-01 | End: 2022-10-01

## 2022-10-01 RX ORDER — ORPHENADRINE CITRATE 100 MG/1
100 TABLET, EXTENDED RELEASE ORAL 2 TIMES DAILY
Qty: 8 TABLET | Refills: 0 | Status: SHIPPED | OUTPATIENT
Start: 2022-10-01 | End: 2022-10-05

## 2022-10-01 RX ADMIN — ACETAMINOPHEN 1000 MG: 500 TABLET ORAL at 02:10

## 2022-10-01 NOTE — ED PROVIDER NOTES
"Encounter Date: 10/1/2022    SCRIBE #1 NOTE: I, Savana Holland, am scribing for, and in the presence of,  Bony Mar PA-C. I have scribed the following portions of the note - Other sections scribed: HPI, ROS.     History     Chief Complaint   Patient presents with    Leg Pain     Pt to ED with complaints of L leg pain that begins in the calf and "shoots up" to upper thigh. Pt reports pain began at approx 0200. Pt states the pain is accompanied by numbness/tingling in L foot. Denies weakness, blurred vision, slurred speech. Denies pain medication PTA. Denies trauma or recent travel.      Gauri Alas is a 37 y.o. female, with no pertinent past medical history, who presents to the ED with left leg pain that began this morning. Pt states it starts in her calf and goes up to the thigh. Pt also reports numbness and tingling in the left foot. Pt notes being on Estrogen birth control. Pt denies any recent travel. No other exacerbating or alleviating factors. Patient denies weakness, back pain, or other associated symptoms.      The history is provided by the patient. No  was used.   Review of patient's allergies indicates:  No Known Allergies  Past Medical History:   Diagnosis Date    Abnormal Pap smear     HPV Neg, repaps    Abnormal Pap smear of cervix 04/2012    ASCUS, NEG HPV    Mental disorder     depression per pt     History reviewed. No pertinent surgical history.  Family History   Problem Relation Age of Onset    Diabetes Maternal Grandmother     Hypertension Father     Breast cancer Neg Hx     Ovarian cancer Neg Hx     Colon cancer Neg Hx      Social History     Tobacco Use    Smoking status: Never    Smokeless tobacco: Never   Substance Use Topics    Alcohol use: Yes     Alcohol/week: 1.0 standard drink     Types: 1 Glasses of wine per week     Comment: socially    Drug use: No     Review of Systems   Constitutional:  Negative for fever.   HENT:  Negative for congestion, sore throat " and trouble swallowing.    Respiratory:  Negative for cough and shortness of breath.    Cardiovascular:  Positive for leg swelling (Left). Negative for chest pain.   Gastrointestinal:  Negative for abdominal pain, constipation, diarrhea, nausea and vomiting.   Genitourinary:  Negative for dysuria, flank pain, frequency and urgency.   Musculoskeletal:  Positive for myalgias (left leg). Negative for back pain.   Skin:  Negative for rash.   Neurological:  Negative for headaches.   All other systems reviewed and are negative.    Physical Exam     Initial Vitals [10/01/22 1255]   BP Pulse Resp Temp SpO2   135/82 75 18 98.8 °F (37.1 °C) 98 %      MAP       --         Physical Exam    Nursing note and vitals reviewed.  Constitutional: She appears well-developed and well-nourished. She is not diaphoretic. No distress.   HENT:   Head: Atraumatic.   Right Ear: External ear normal.   Left Ear: External ear normal.   Eyes: Conjunctivae and EOM are normal.   Neck: No tracheal deviation present.   Normal range of motion.  Cardiovascular:  Normal rate and regular rhythm.           Pulmonary/Chest: No accessory muscle usage or stridor. No tachypnea. No respiratory distress.   Musculoskeletal:         General: Normal range of motion.      Cervical back: Normal range of motion.      Comments: Reproducible tenderness to the left calf and medial left thigh.  Subtle swelling to the left calf when compared to the right.  No overlying erythema or crepitus.  No popliteal space tenderness.  No bony deformities or bony tenderness to the lower extremities.  Full weight-bearing to left lower extremity and ambulatory.  Distal skin perfusion normal and pulses 2+ and equal.  Full ROM of lower extremity.  No foot drop.  Soft compartments.     Neurological: She is alert and oriented to person, place, and time. She displays no tremor. She displays no seizure activity. Coordination and gait normal.   Skin: Skin is intact. Capillary refill takes less  than 2 seconds. No rash noted. No erythema.       ED Course   Procedures  Labs Reviewed   POCT URINE PREGNANCY          Imaging Results              US Lower Extremity Veins Left (Final result)  Result time 10/01/22 14:41:47      Final result by Navi Aguilar MD (10/01/22 14:41:47)                   Impression:      No evidence of deep venous thrombosis in the left lower extremity.      Electronically signed by: Navi Aguilar MD  Date:    10/01/2022  Time:    14:41               Narrative:    EXAMINATION:  US LOWER EXTREMITY VEINS LEFT    CLINICAL HISTORY:  Pain in left lower leg    TECHNIQUE:  Duplex and color flow Doppler evaluation and graded compression of the left lower extremity veins was performed.    COMPARISON:  None    FINDINGS:  Duplex and color flow Doppler evaluation does not reveal any evidence of acute venous thrombosis in the common femoral, superficial femoral, greater saphenous, popliteal, peroneal, anterior tibial and posterior tibial veins of the left lower extremity.  There is no reflux to suggest valvular incompetence.                                       Medications   acetaminophen tablet 1,000 mg (1,000 mg Oral Given 10/1/22 3627)     Medical Decision Making:   History:   Old Medical Records: I decided to obtain old medical records.  ED Management:  In the absence of other findings, symptoms could be muscular.  Ultrasound shows no DVT or Baker cyst.  Normal pulses and skin perfusion.  Soft compartments.  No history of trauma.  No deficits.  Ambulatory.  Advising follow-up with PCP. Strict return precautions discussed.  Agreeable to plan.        Scribe Attestation:   Scribe #1: I performed the above scribed service and the documentation accurately describes the services I performed. I attest to the accuracy of the note.                   Clinical Impression:   Final diagnoses:  [M79.662] Pain of left calf (Primary)      ED Disposition Condition    Discharge Stable          ED  Prescriptions       Medication Sig Dispense Start Date End Date Auth. Provider    orphenadrine (NORFLEX) 100 mg tablet Take 1 tablet (100 mg total) by mouth 2 (two) times daily. for 4 days 8 tablet 10/1/2022 10/5/2022 Bony Mar PA-C    meloxicam (MOBIC) 7.5 MG tablet Take 1 tablet (7.5 mg total) by mouth once daily. 12 tablet 10/1/2022 -- Bony Mar PA-C          Follow-up Information       Follow up With Specialties Details Why Contact Info    Kaye De La Garza, NP Family Medicine Schedule an appointment as soon as possible for a visit in 2 days For re-evaluation [address] [facility] [address]  [phone]      [facility] - Emergency Dept Emergency Medicine Go to  If symptoms worsen [address]  [facility] [id number] [phone]          I, Bony Mar PA-C, personally performed the services described in this documentation. All medical record entries made by the scribe were at my direction and in my presence. I have reviewed the chart and agree that the record reflects my personal performance and is accurate and complete.      Bony Mar PA-C  10/01/22 1513

## 2022-10-01 NOTE — ED TRIAGE NOTES
"Pt presents to the ED with complaints  of L leg pain that begins in the calf and "shoots up" to upper thigh  accompanied with numbness and tingling to her left foot that started at 2 am this morning  Pt denies any other symptoms  Pt AAOX4  "

## 2022-10-01 NOTE — DISCHARGE INSTRUCTIONS

## 2023-01-02 PROBLEM — N39.0 URINARY TRACT INFECTION: Status: RESOLVED | Noted: 2022-10-01 | Resolved: 2023-01-02
